# Patient Record
Sex: FEMALE | Race: WHITE | NOT HISPANIC OR LATINO | URBAN - NONMETROPOLITAN AREA
[De-identification: names, ages, dates, MRNs, and addresses within clinical notes are randomized per-mention and may not be internally consistent; named-entity substitution may affect disease eponyms.]

---

## 2020-08-17 ENCOUNTER — IMPORTED ENCOUNTER (OUTPATIENT)
Dept: URBAN - NONMETROPOLITAN AREA CLINIC 1 | Facility: CLINIC | Age: 50
End: 2020-08-17

## 2020-08-17 PROBLEM — H16.223: Noted: 2020-08-17

## 2020-08-17 PROBLEM — H52.223: Noted: 2020-08-17

## 2020-08-17 PROBLEM — H52.11: Noted: 2020-08-17

## 2020-08-17 PROBLEM — H52.4: Noted: 2020-08-17

## 2020-08-17 PROCEDURE — 92015 DETERMINE REFRACTIVE STATE: CPT

## 2020-08-17 PROCEDURE — 92014 COMPRE OPH EXAM EST PT 1/>: CPT

## 2020-08-17 NOTE — PATIENT DISCUSSION
Compound Myopic Astigmatism OD/Simple Astigmatism OS w/Presbyopia-  discussed findings w/patient-  MR done patient defers Rx today -  s/p LASIK OU -  montiro yearly or prn LINH OU -  discussed findings w/patient-  continue Salin PRN -  monitor yearly or prn

## 2021-08-25 ENCOUNTER — IMPORTED ENCOUNTER (OUTPATIENT)
Dept: URBAN - NONMETROPOLITAN AREA CLINIC 1 | Facility: CLINIC | Age: 51
End: 2021-08-25

## 2021-08-25 PROCEDURE — 92014 COMPRE OPH EXAM EST PT 1/>: CPT

## 2021-08-25 PROCEDURE — 92015 DETERMINE REFRACTIVE STATE: CPT

## 2021-08-25 NOTE — PATIENT DISCUSSION
Compound Myopic Astigmatism OD/Simple Astigmatism OS w/Presbyopia-  discussed findings w/patient-  MR done patient defers Rx today -  s/p LASIK OU -  monitor yearly or rpnDES OU -  discussed findings w/patient-  slight worsening at this time-  patient to start Refresh Digital at least BID OU-  continue to monitor yearly or prn; 's Notes:  8/25/2021DFE 8/25/2021

## 2022-04-09 ASSESSMENT — VISUAL ACUITY
OU_CC: J1+
OD_CC: 20/30
OD_CC: 20/30
OU_CC: 20/20
OU_CC: 20/20
OS_CC: 20/20
OS_CC: 20/20
OU_CC: 20/20-1

## 2022-04-09 ASSESSMENT — TONOMETRY
OS_IOP_MMHG: 16
OD_IOP_MMHG: 15
OD_IOP_MMHG: 16
OS_IOP_MMHG: 15